# Patient Record
Sex: FEMALE | Race: WHITE
[De-identification: names, ages, dates, MRNs, and addresses within clinical notes are randomized per-mention and may not be internally consistent; named-entity substitution may affect disease eponyms.]

---

## 2018-11-21 ENCOUNTER — HOSPITAL ENCOUNTER (EMERGENCY)
Dept: HOSPITAL 36 - ER | Age: 48
Discharge: HOME | End: 2018-11-21
Payer: MEDICAID

## 2018-11-21 DIAGNOSIS — Z98.890: ICD-10-CM

## 2018-11-21 DIAGNOSIS — R10.84: Primary | ICD-10-CM

## 2018-11-21 LAB
AMYLASE SERPL-CCNC: 27 U/L (ref 29–103)
ANION GAP SERPL CALC-SCNC: 10.7 MMOL/L (ref 7–16)
BASOPHILS # BLD AUTO: 0 TH/CUMM (ref 0–0.2)
BASOPHILS NFR BLD AUTO: 0.5 % (ref 0–2)
BUN SERPL-MCNC: 13 MG/DL (ref 7–25)
CALCIUM SERPL-MCNC: 9.7 MG/DL (ref 8.6–10.3)
CHLORIDE SERPL-SCNC: 101 MEQ/L (ref 98–107)
CO2 SERPL-SCNC: 28.5 MEQ/L (ref 21–31)
CREAT SERPL-MCNC: 1.2 MG/DL (ref 0.6–1.2)
EOSINOPHIL # BLD AUTO: 0.1 TH/CMM (ref 0.1–0.4)
EOSINOPHIL NFR BLD AUTO: 0.9 % (ref 0–5)
ERYTHROCYTE [DISTWIDTH] IN BLOOD BY AUTOMATED COUNT: 12 % (ref 11.5–20)
GLUCOSE SERPL-MCNC: 109 MG/DL (ref 70–105)
HCT VFR BLD CALC: 39.1 % (ref 41–60)
HGB BLD-MCNC: 13.5 GM/DL (ref 12–16)
LIPASE SERPL-CCNC: 40 U/L (ref 11–82)
LYMPHOCYTE AB SER FC-ACNC: 2.5 TH/CMM (ref 1.5–3)
LYMPHOCYTES NFR BLD AUTO: 26.8 % (ref 20–50)
MCH RBC QN AUTO: 33.4 PG (ref 27–31)
MCHC RBC AUTO-ENTMCNC: 34.6 PG (ref 28–36)
MCV RBC AUTO: 96.5 FL (ref 81–100)
MONOCYTES # BLD AUTO: 0.8 TH/CMM (ref 0.3–1)
MONOCYTES NFR BLD AUTO: 8.1 % (ref 2–10)
NEUTROPHILS # BLD: 6 TH/CMM (ref 1.8–8)
NEUTROPHILS NFR BLD AUTO: 63.7 % (ref 40–80)
PLATELET # BLD: 225 TH/CMM (ref 150–400)
PMV BLD AUTO: 7.2 FL
POTASSIUM SERPL-SCNC: 4.2 MEQ/L (ref 3.5–5.1)
RBC # BLD AUTO: 4.05 MIL/CMM (ref 3.8–5.1)
SODIUM SERPL-SCNC: 136 MEQ/L (ref 136–145)
WBC # BLD AUTO: 9.4 TH/CMM (ref 4.8–10.8)

## 2018-11-21 PROCEDURE — Z7502: HCPCS

## 2018-11-21 NOTE — ED PHYSICIAN CHART
ED Chief Complaint/HPI





- Patient Information


Date Seen:: 11/21/18


Time Seen:: 14:05


Chief Complaint:: Abdominal Pain


History of Present Illness:: 





onset x 3 days of intermittenrt, diffuse, crampy abdominal pain, and pelvic pain

; pt denies trauma, LOC, ALOC, AMS, H/As, S/T, neck pain, C/P, SOB, A/N/V/D/C, 

VB, VD, fever, chills, or urinary s/s; pt is post-menopausal for past few years

; pt is eating and urinating well


Allergies:: 


 Allergies











Allergy/AdvReac Type Severity Reaction Status Date / Time


 


MDX No Known Allergies - Nka Allergy   Verified 04/09/15 00:26





[No Known Allergies - Nka]     











Historian:: Patient


Review:: Nurse's Note Reviewed





ED Review of Systems





- Review of Systems


General/Constitutional: No fever, No chills, No weight loss, No weakness, No 

diaphoresis, No edema, No loss of appetite


Skin: No skin lesions, No rash, No bruising


Head: No headache, No light-headedness


Eyes: No loss of vision, No pain, No diplopia


ENT: No earache, No nasal drainage, No sore throat, No tinnitus


Neck: No neck pain, No swelling, No thyromegaly, No stiffness, No mass noted


Cardio Vascular: No chest pain, No palpitations, No PND, No orthopnea, No edema


Pulmonary: No SOB, No cough, No sputum, No wheezing


GI: No nausea, No vomiting, No diarrhea, Pain, No melena, No hematochezia, No 

constipation, No hematemesis


G/U: No dysuria, No frequency, No hematuria


Ob/Gyn: No vaginal discharge, No abnormal vaginal bleed, No contraction


Musculoskeletal: Bone or joint pain, Back pain, No back pain, No muscle pain


Endocrine: No polyuria, No polydipsia


Psychiatric: No prior psych history, No depression, No anxiety, No suicidal 

ideation, No homicidal ideation, No auditory hallucination, No visual 

hallucination


Hematopoietic: No bruising, No lymphadenopathy


Allergic/Immuno: No urticaria, No angioedema


Neurological: No syncope, No focal symptoms, No weakness, No paresthesia, No 

headache, No seizure, No dizziness, No confusion, No vertigo





ED Past Medical History





- Past Medical History


Obtainable: Yes


Past Medical History: Other (Ovarian Cysts)


Family History: None


Social History: Non Smoker, No Alcohol, No Drug Use, Single, Employed


Surgical History: other (Back Surgery)


Psychiatricy History: None


Medication: Reviewed





Family Medical History





- Family Member


  ** Mother


History Unknown: Yes





  ** Uncle


Hx Family Cancer: Yes





ED Physical Exam





- Physical Examination


General/Constitutional: Awake, Well-developed, well-nourished, Alert, No 

distress, GCS 15, Non-toxic appearing, Ambulatory


Head: Atraumatic


Eyes: Lids, conjuctiva normal, PERRL, EOMI


Skin: Nl inspection, No rash, No skin lesions, No ecchymosis, Well hydrated, No 

lymphadenopathy


ENMT: External ears, nose nl, TM canals nl, Nasal exam nl, Lips, teeth, gums nl

, Oropharynx nl, Tonsils nl


Neck: Nontender, Full ROM w/o pain, No JVD, No nuchal rigidity, No bruit, No 

mass, No stridor


Other Neck comments:: 





supple; no meningeal signs; no cervical tenderness


Respiratory: Nl effort/Exclusion, Clear to Auscultation, No Wheeze/Rhonchi/Rales


Cardio Vascular: RRR, No murmur, gallop, rubs, NL S1 S2, Carotid/Femoral/Distal 

pulses equal bilaterally


GI: No tenderness/rebounding/guarding, No organomegaly, No hernia, Normal BS's, 

Nondistended, No mass/bruits, No McBurney tenderness


Other GI comments:: 





no pulsatile masses


: No CVA tenderness


Extremities: No tenderness or effusion, Full ROM, normal strength in all 

extremities, No edema, Normal digits & nails


Neuro/Psych: Alert/oriented, DTR's symmetric, Normal sensory exam, Normal motor 

strength, Judgement/insight normal, Mood normal, Normal gait, No focal deficits


Other Neuro/Psych comments:: 





no focal signs


Misc: Normal back, No paraspinal tenderness





ED Labs/Radiology/EKG Results





- Lab Results


Results: 


 Laboratory Tests











  11/21/18 11/21/18





  14:25 14:25


 


WBC  9.4 


 


RBC  4.05 


 


Hgb  13.5 


 


Hct  39.1 L 


 


MCV  96.5 


 


MCH  33.4 H 


 


MCHC Differential  34.6 


 


RDW  12.0 


 


Plt Count  225 


 


MPV  7.2 


 


Neutrophils %  63.7 


 


Lymphocytes %  26.8 


 


Monocytes %  8.1 


 


Eosinophils %  0.9 


 


Basophils %  0.5 


 


Sodium   136


 


Potassium   4.2


 


Chloride   101


 


Carbon Dioxide   28.5


 


Anion Gap   10.7


 


BUN   13


 


Creatinine   1.2


 


Est GFR ( Amer)   > 60.0


 


Est GFR (Non-Af Amer)   51.4


 


BUN/Creatinine Ratio   10.8


 


Glucose   109 H


 


Calcium   9.7


 


Amylase   27 L


 


Lipase   40











Comments:: 





Reviewed





- Radiology Results


Comments:: 





NAD





ED Septic Shock





- .


Is Septic Shock (SBP<90, OR Lactate>4 mmol\L) present?: No





ED Reassessment (Disposition)





- Reassessment


Reassessment:: 





pt is asymptomatic upon discharge


Reassessment Condition:: Improved





- Diagnosis


Diagnosis:: 





Abdominal Pain; Pelvic Pain; Abdominal/Pelvic Pain-resolved





- Aftercare/Follow up Instructions


Aftercare/Follow-Up Instructions:: Counseled pt regarding lab results/diagnosis 

& need follow up, Refer to Discharge Instructions, Counseled pt & family 

regarding lab results/diagnosis & need follow up





- Patient Disposition


Discharge/Transfer:: Home


Condition at Disposition:: Stable, Improved (X-Rays Instructions; RTER prn if 

existing s/s reoccur and/or get worse and/or any other new s/s occur; ACIs 

given for all above Dx; Refer to GI/ Specialists/OB-GYN Specialist/Internist 

ASAP; F/U with PMD in one day or prn; RTER prn if concerned)

## 2018-11-22 NOTE — DIAGNOSTIC IMAGING REPORT
Ultrasound abdomen



HISTORY: Abdominal pain



COMPARISON: CT abdomen and pelvis the same day



Technique: Sonography of the abdomen was performed in multiple planes.



FINDINGS: Exam is limited due to bowel gas



The liver demonstrates increased echogenicity with no evidence of focal

lesions. The liver measures 18.6 cm. The gallbladder is contracted

limiting its evaluation.  The common bile duct measures 7 mm.



Evaluation of the pancreas is limited due to bowel gas. 



The right kidney measures 10.4 x 4.1 cm. No evidence of focal lesions or

hydronephrosis. The left kidney measures 11.1 x 6.0 cm.  No evidence of

focal lesions or hydronephrosis.



The spleen measures 8.7 cm.  The abdominal aorta is grossly

unremarkable.



IMPRESSION:



Contracted gallbladder limiting its evaluation.  If necessary, repeat

exam after fasting may be obtained.



Borderline prominent common bile duct for patient's age.  Correlate

clinically.  If necessary follow-up may be obtained.



Mild hepatomegaly with increased echogenicity which may be due to fatty

infiltration.



No evidence of hydronephrosis.

## 2018-11-22 NOTE — DIAGNOSTIC IMAGING REPORT
Ultrasound pelvis



HISTORY: Right lower quadrant pain.  LMP unknown.  Beta hCG is negative.



COMPARISON: Abdomen and pelvis CT examination performed the same day



Technique: Longitudinal and transverse sonographic sector images of the

pelvis were obtained transabdominally and transvaginally.



FINDINGS:



Exam is limited due to bowel gas and body habitus.  The uterus measures

3.4 x 2.2 x 2.8 cm and demonstrates a heterogeneous echotexture.  The

uterine margins are not well-defined.  The Endometrial complex measures

3 mm.  The ovary was not visualized.  No free fluid identified.



IMPRESSION:



Limited exam due to bowel gas and body habitus.



Decreased size of the uterus.  The significance of this finding should

be correlated clinically.



The ovaries are not visualized.



Please also refer to CT examination the same day for further details.

## 2018-11-22 NOTE — DIAGNOSTIC IMAGING REPORT
CT abdomen and pelvis without intravenous contrast



Indication: Abdominal pain



Comparison: Ultrasound abdomen and pelvis on 11/21/2018,



Technique: Axial images were obtained from the lung bases to the

bilateral proximal femurs without IV contrast.  Coronal reconstructions

were made.  total DLP: 413,  CTDI 8.9    



FINDINGS:



Hypoventilatory atelectatic changes of the lung bases are noted. 

Assessment of the solid organs is limited due to lack of IV contrast. 

There is fatty infiltration of the liver.  No focal lesions.  No focal

splenic lesions.  No focal pancreatic or adrenal lesions.  No evidence

of hydronephrosis or nephrolithiasis.  No evidence of bowel obstruction.

 There is fatty infiltration of the colonic mucosa.  Area of colonic

wall narrowing of the transverse colon is noted nonspecific and may be

related to peristalsis



Distended stomach is noted with fluid contents.  There is area of fluid

density seen along the high large region of the stomach probably related

intraluminal.  No free fluid or free air.  The appendix is not

well-visualized.  Moderate atherosclerosis is noted.  Degenerative

changes of the spine are noted.



IMPRESSION:



Limited exam due to lack of IV and oral contrast.



Distended stomach with food contents.  There is a fluid density area

along the distal stomach probably along the pyloric region which is

likely due to pyloric distended stomach.  Again exam is limited due to

lack of oral contrast.  A follow-up exam with oral contrast would

provide for additional delineation of the stomach and small and large

bowel



Areas of fatty infiltration of the colonic mucosa with areas of

narrowing of the colonic wall which may be transient etiology.  Again

short-term follow-up exam including follow up exam with oral contrast

may be obtained for further assessment.  Endoscopic correlation would

also be helpful.



The appendix was not visualized.



No evidence of hydronephrosis.



Hepatic steatosis.



Atherosclerotic vascular disease.